# Patient Record
Sex: MALE | Race: WHITE | NOT HISPANIC OR LATINO | Employment: FULL TIME | URBAN - NONMETROPOLITAN AREA
[De-identification: names, ages, dates, MRNs, and addresses within clinical notes are randomized per-mention and may not be internally consistent; named-entity substitution may affect disease eponyms.]

---

## 2019-05-20 ENCOUNTER — LAB (OUTPATIENT)
Dept: LAB | Facility: HOSPITAL | Age: 44
End: 2019-05-20

## 2019-05-20 ENCOUNTER — OFFICE VISIT (OUTPATIENT)
Dept: INTERNAL MEDICINE | Facility: CLINIC | Age: 44
End: 2019-05-20

## 2019-05-20 VITALS
DIASTOLIC BLOOD PRESSURE: 82 MMHG | SYSTOLIC BLOOD PRESSURE: 120 MMHG | HEIGHT: 73 IN | BODY MASS INDEX: 32.47 KG/M2 | WEIGHT: 245 LBS | RESPIRATION RATE: 16 BRPM | HEART RATE: 56 BPM | OXYGEN SATURATION: 98 %

## 2019-05-20 DIAGNOSIS — Z94.7 CORNEAL TRANSPLANT STATUS: ICD-10-CM

## 2019-05-20 DIAGNOSIS — Z76.89 ENCOUNTER TO ESTABLISH CARE: ICD-10-CM

## 2019-05-20 DIAGNOSIS — K21.9 GASTROESOPHAGEAL REFLUX DISEASE WITHOUT ESOPHAGITIS: ICD-10-CM

## 2019-05-20 DIAGNOSIS — Z76.89 ENCOUNTER TO ESTABLISH CARE: Primary | ICD-10-CM

## 2019-05-20 DIAGNOSIS — R53.83 FATIGUE, UNSPECIFIED TYPE: ICD-10-CM

## 2019-05-20 LAB
ALBUMIN SERPL-MCNC: 4.4 G/DL (ref 3.5–5)
ALBUMIN/GLOB SERPL: 1.5 G/DL (ref 1.1–2.5)
ALP SERPL-CCNC: 58 U/L (ref 24–120)
ALT SERPL W P-5'-P-CCNC: 41 U/L (ref 0–54)
ANION GAP SERPL CALCULATED.3IONS-SCNC: 8 MMOL/L (ref 4–13)
ARTICHOKE IGE QN: 111 MG/DL (ref 0–99)
AST SERPL-CCNC: 38 U/L (ref 7–45)
BASOPHILS # BLD AUTO: 0.05 10*3/MM3 (ref 0–0.2)
BASOPHILS NFR BLD AUTO: 0.9 % (ref 0–2)
BILIRUB SERPL-MCNC: 0.6 MG/DL (ref 0.1–1)
BUN BLD-MCNC: 17 MG/DL (ref 5–21)
BUN/CREAT SERPL: 15.9 (ref 7–25)
CALCIUM SPEC-SCNC: 9.1 MG/DL (ref 8.4–10.4)
CHLORIDE SERPL-SCNC: 105 MMOL/L (ref 98–110)
CHOLEST SERPL-MCNC: 198 MG/DL (ref 130–200)
CO2 SERPL-SCNC: 28 MMOL/L (ref 24–31)
CREAT BLD-MCNC: 1.07 MG/DL (ref 0.5–1.4)
DEPRECATED RDW RBC AUTO: 39.8 FL (ref 40–54)
EOSINOPHIL # BLD AUTO: 0.15 10*3/MM3 (ref 0–0.7)
EOSINOPHIL NFR BLD AUTO: 2.6 % (ref 0–4)
ERYTHROCYTE [DISTWIDTH] IN BLOOD BY AUTOMATED COUNT: 12.6 % (ref 12–15)
GFR SERPL CREATININE-BSD FRML MDRD: 75 ML/MIN/1.73
GLOBULIN UR ELPH-MCNC: 2.9 GM/DL
GLUCOSE BLD-MCNC: 82 MG/DL (ref 70–100)
HCT VFR BLD AUTO: 41.7 % (ref 40–52)
HDLC SERPL-MCNC: 58 MG/DL
HGB BLD-MCNC: 14 G/DL (ref 14–18)
IMM GRANULOCYTES # BLD AUTO: 0.03 10*3/MM3 (ref 0–0.05)
IMM GRANULOCYTES NFR BLD AUTO: 0.5 % (ref 0–5)
LDLC/HDLC SERPL: 2.26 {RATIO}
LYMPHOCYTES # BLD AUTO: 2.17 10*3/MM3 (ref 0.72–4.86)
LYMPHOCYTES NFR BLD AUTO: 38.1 % (ref 15–45)
MCH RBC QN AUTO: 29 PG (ref 28–32)
MCHC RBC AUTO-ENTMCNC: 33.6 G/DL (ref 33–36)
MCV RBC AUTO: 86.5 FL (ref 82–95)
MONOCYTES # BLD AUTO: 0.56 10*3/MM3 (ref 0.19–1.3)
MONOCYTES NFR BLD AUTO: 9.8 % (ref 4–12)
NEUTROPHILS # BLD AUTO: 2.73 10*3/MM3 (ref 1.87–8.4)
NEUTROPHILS NFR BLD AUTO: 48.1 % (ref 39–78)
NRBC BLD AUTO-RTO: 0 /100 WBC (ref 0–0.2)
PLATELET # BLD AUTO: 239 10*3/MM3 (ref 130–400)
PMV BLD AUTO: 10 FL (ref 6–12)
POTASSIUM BLD-SCNC: 4.4 MMOL/L (ref 3.5–5.3)
PROT SERPL-MCNC: 7.3 G/DL (ref 6.3–8.7)
RBC # BLD AUTO: 4.82 10*6/MM3 (ref 4.8–5.9)
SODIUM BLD-SCNC: 141 MMOL/L (ref 135–145)
TRIGL SERPL-MCNC: 46 MG/DL (ref 0–149)
TSH SERPL DL<=0.05 MIU/L-ACNC: 2.68 MIU/ML (ref 0.47–4.68)
WBC NRBC COR # BLD: 5.69 10*3/MM3 (ref 4.8–10.8)

## 2019-05-20 PROCEDURE — 84443 ASSAY THYROID STIM HORMONE: CPT | Performed by: INTERNAL MEDICINE

## 2019-05-20 PROCEDURE — 99203 OFFICE O/P NEW LOW 30 MIN: CPT | Performed by: INTERNAL MEDICINE

## 2019-05-20 PROCEDURE — 85025 COMPLETE CBC W/AUTO DIFF WBC: CPT | Performed by: INTERNAL MEDICINE

## 2019-05-20 PROCEDURE — 36415 COLL VENOUS BLD VENIPUNCTURE: CPT

## 2019-05-20 PROCEDURE — 84403 ASSAY OF TOTAL TESTOSTERONE: CPT | Performed by: INTERNAL MEDICINE

## 2019-05-20 PROCEDURE — 80061 LIPID PANEL: CPT | Performed by: INTERNAL MEDICINE

## 2019-05-20 PROCEDURE — 80053 COMPREHEN METABOLIC PANEL: CPT | Performed by: INTERNAL MEDICINE

## 2019-05-20 NOTE — PROGRESS NOTES
Chief Complaint   Patient presents with   • [facility]     No complaints         History:  Tejas Fernandes is a 43 y.o. male who presents today to [facility].  He has been feeling somewhat fatigued.  He has more energy when he works out but has not been doing so lately.  He has gained 25 to 30 pounds in the last year and a half and does report some snoring at night.  He is unsure whether he is fatigued related to having young children but is also concerned about low testosterone.  He also reports intermittent reflux disease, waking up sometimes in the middle the night with reflux.  This is partially resolved by sleeping upright.  He has not tried any medications including over-the-counter medications.    HPI    ROS:  Review of Systems   Constitutional: Positive for activity change and fatigue.   HENT: Negative for hearing loss and trouble swallowing.    Eyes: Positive for visual disturbance (Decreased vision right eye since his corneal transplant).        Has history of right corneal transplant   Respiratory: Negative for cough, chest tightness and shortness of breath.    Cardiovascular: Negative for chest pain and leg swelling.   Gastrointestinal: Negative for abdominal distention, abdominal pain, nausea and vomiting.   Genitourinary: Negative for difficulty urinating, frequency and urgency.   Musculoskeletal: Negative for arthralgias, back pain and gait problem.   Skin: Negative.    Neurological: Negative for dizziness and facial asymmetry.   Hematological: Negative for adenopathy.   Psychiatric/Behavioral: Negative for decreased concentration.       No Known Allergies  Past Medical History:   Diagnosis Date   • GERD (gastroesophageal reflux disease)      Past Surgical History:   Procedure Laterality Date   • CORNEAL TRANSPLANT  1997   • TONSILLECTOMY  1980     Family History   Problem Relation Age of Onset   • Cancer Mother    • Diabetes Mother    • Cancer Father    • Other Maternal Grandmother    • Stroke  "Maternal Grandfather    • Cancer Paternal Grandmother      Tejas  reports that he has never smoked. He has never used smokeless tobacco. He reports that he drinks alcohol. He reports that he does not use drugs.    I have reviewed and updated the above documentation (if necessary) including but not limited to chief complaint, ROS, PFSH, allergies and medications      No current outpatient medications on file.    OBJECTIVE:    Visit Vitals  /82 (BP Location: Left arm, Patient Position: Sitting, Cuff Size: Adult)   Pulse 56   Resp 16   Ht 185.4 cm (73\")   Wt 111 kg (245 lb)   SpO2 98%   BMI 32.32 kg/m²       Physical Exam   Constitutional: He appears well-developed and well-nourished. No distress.   HENT:   Head: Normocephalic and atraumatic.   Mouth/Throat: No oropharyngeal exudate.   Eyes: Conjunctivae and EOM are normal. Right eye exhibits no discharge. Left eye exhibits no discharge. No scleral icterus. Right pupil is not reactive. Left pupil is reactive. Pupils are unequal.   Right pupil nonreactive and dilated, chronic issue related to corneal transplant   Neck: Normal range of motion. No JVD present.   Cardiovascular: Normal rate and regular rhythm.   No murmur heard.  Pulmonary/Chest: Effort normal and breath sounds normal. No stridor. No respiratory distress. He has no wheezes. He has no rales.   Abdominal: Soft. Bowel sounds are normal. He exhibits no distension. There is no tenderness. There is no guarding.   Musculoskeletal: He exhibits no edema or tenderness.   Lymphadenopathy:     He has no cervical adenopathy.   Neurological: He is alert.   Skin: Skin is warm and dry.   Psychiatric: He has a normal mood and affect. His behavior is normal.   Vitals reviewed.      Assessment/Plan    Tejas was seen today for establish care.    Diagnoses and all orders for this visit:    Encounter to establish care    Fatigue, unspecified type  -     CBC w AUTO Differential; Future  -     Comprehensive metabolic " panel; Future  -     TSH; Future  -     Testosterone; Future    Gastroesophageal reflux disease without esophagitis    Corneal transplant status    Fatigue: We will check general labs including CBC, CMP, TSH and testosterone.  He does snore and there is a possibility of having obstructive sleep apnea.  He wishes to wait on sleep study at this point and this is reasonable.  He will attempt to lose some weight and see if his snoring improves.    GERD: His lifestyle modifications have been adequate in treating.  Discussed H2 blockers if he continues with symptoms    Right-sided corneal transplant currently on currently no issues    Health maintenance: We will check CBC, CMP and lipid profile.  He has not eaten today.  Next visit discuss prostate cancer screening and colon cancer screening given father history at age 70 of colon cancer    An After Visit Summary was printed and given to the patient at discharge.  Return in about 1 year (around 5/20/2020) for Annual physical.         ISSAC Alvarado MD   11:07 AM  5/20/2019

## 2019-05-21 LAB — TESTOST SERPL-MCNC: 363 NG/DL (ref 264–916)

## 2019-05-23 ENCOUNTER — TELEPHONE (OUTPATIENT)
Dept: INTERNAL MEDICINE | Facility: CLINIC | Age: 44
End: 2019-05-23

## 2020-06-04 DIAGNOSIS — G47.33 OSA (OBSTRUCTIVE SLEEP APNEA): Primary | ICD-10-CM

## 2020-06-17 ENCOUNTER — HOSPITAL ENCOUNTER (OUTPATIENT)
Dept: SLEEP MEDICINE | Facility: HOSPITAL | Age: 45
Discharge: HOME | End: 2020-06-17
Admitting: INTERNAL MEDICINE

## 2020-06-17 DIAGNOSIS — G47.33 OSA (OBSTRUCTIVE SLEEP APNEA): ICD-10-CM

## 2020-06-17 PROCEDURE — 95800 SLP STDY UNATTENDED: CPT

## 2020-06-25 PROCEDURE — 95800 SLP STDY UNATTENDED: CPT | Performed by: PSYCHIATRY & NEUROLOGY

## 2020-06-29 ENCOUNTER — TELEPHONE (OUTPATIENT)
Dept: INTERNAL MEDICINE | Facility: CLINIC | Age: 45
End: 2020-06-29

## 2020-06-29 NOTE — TELEPHONE ENCOUNTER
Spoke with patient regarding his home sleep study. He is aware of results and he understood that Dr. Rojo will be contacting him to get set up with a CPAP machine.

## 2020-06-29 NOTE — TELEPHONE ENCOUNTER
----- Message from MARIELLA Alvarado MD sent at 6/29/2020  9:15 AM CDT -----  Please notify patient he does have sleep apnea.  He will be contacted by Dr. Rojo to get established with CPAP

## 2020-07-20 ENCOUNTER — OFFICE VISIT (OUTPATIENT)
Dept: INTERNAL MEDICINE | Facility: CLINIC | Age: 45
End: 2020-07-20

## 2020-07-20 VITALS
RESPIRATION RATE: 18 BRPM | HEIGHT: 73 IN | WEIGHT: 255.4 LBS | DIASTOLIC BLOOD PRESSURE: 77 MMHG | SYSTOLIC BLOOD PRESSURE: 133 MMHG | TEMPERATURE: 98.3 F | BODY MASS INDEX: 33.85 KG/M2 | HEART RATE: 77 BPM | OXYGEN SATURATION: 98 %

## 2020-07-20 DIAGNOSIS — C43.62 MALIGNANT MELANOMA OF LEFT UPPER EXTREMITY INCLUDING SHOULDER (HCC): ICD-10-CM

## 2020-07-20 DIAGNOSIS — E78.2 MODERATE MIXED HYPERLIPIDEMIA NOT REQUIRING STATIN THERAPY: ICD-10-CM

## 2020-07-20 DIAGNOSIS — Z00.00 ENCOUNTER FOR PREVENTIVE CARE: Primary | ICD-10-CM

## 2020-07-20 DIAGNOSIS — G47.33 OSA (OBSTRUCTIVE SLEEP APNEA): ICD-10-CM

## 2020-07-20 DIAGNOSIS — Z11.59 ENCOUNTER FOR HEPATITIS C SCREENING TEST FOR LOW RISK PATIENT: ICD-10-CM

## 2020-07-20 DIAGNOSIS — Z13.6 HYPERTENSION SCREEN: ICD-10-CM

## 2020-07-20 PROBLEM — C43.9 MELANOMA (HCC): Status: ACTIVE | Noted: 2020-07-20

## 2020-07-20 PROCEDURE — 99396 PREV VISIT EST AGE 40-64: CPT | Performed by: INTERNAL MEDICINE

## 2020-07-20 NOTE — PROGRESS NOTES
Chief Complaint   Patient presents with   • Office Visit   • Physical         History:  Tejas Fernandes is a 44 y.o. male who presents today for his annual physical.  He has been doing well.  Since last visit he was diagnosed with obstructive sleep apnea.  He is now on his CPAP and had his first night this past Friday.  He has not had good sleep yet, due to the mask and getting used to the machine.    He was diagnosed with left arm melanoma by Dr. Yani Vargas and has had surgery.    He went to [facility] for [facility] therapy    He has gained weight during COVID-19 and will be starting to exercise soon.    Otherwise he denies any complaints or new issues.      HPI    ROS:  Review of Systems   Constitutional: Negative for activity change and fatigue.   HENT: Negative for hearing loss and trouble swallowing.    Eyes: Positive for visual disturbance (Decreased vision right eye since his corneal transplant).        Has history of right corneal transplant   Respiratory: Negative for cough, chest tightness and shortness of breath.    Cardiovascular: Negative for chest pain and leg swelling.   Gastrointestinal: Negative for abdominal distention, abdominal pain, nausea and vomiting.   Genitourinary: Negative for difficulty urinating, frequency and urgency.   Musculoskeletal: Negative for arthralgias, back pain and gait problem.   Skin: Negative.    Neurological: Negative for dizziness and facial asymmetry.   Hematological: Negative for adenopathy.   Psychiatric/Behavioral: Positive for sleep disturbance. Negative for decreased concentration.       No Known Allergies  Past Medical History:   Diagnosis Date   • GERD (gastroesophageal reflux disease)      Past Surgical History:   Procedure Laterality Date   • CORNEAL TRANSPLANT  1997   • TONSILLECTOMY  1980     Family History   Problem Relation Age of Onset   • Cancer Mother    • Diabetes Mother    • Cancer Father    • Other Maternal Grandmother    • Stroke Maternal Grandfather   "  • Cancer Paternal Grandmother      Tejas  reports that he has never smoked. He has never used smokeless tobacco. He reports that he drinks alcohol. He reports that he does not use drugs.    I have reviewed and updated the above documentation (if necessary) including but not limited to chief complaint, ROS, PFSH, allergies and medications        Current Outpatient Medications:   •  TESTOSTERONE IM, Inject 0.5 mL into the appropriate muscle as directed by prescriber 2 (Two) Times a Week., Disp: , Rfl:     OBJECTIVE:    Visit Vitals  /77 (BP Location: Left arm, Patient Position: Sitting, Cuff Size: Adult)   Pulse 77   Temp 98.3 °F (36.8 °C) (Oral)   Resp 18   Ht 185.4 cm (73\")   Wt 116 kg (255 lb 6.4 oz)   SpO2 98%   BMI 33.70 kg/m²       Physical Exam   Constitutional: He appears well-developed and well-nourished. No distress.   HENT:   Head: Normocephalic and atraumatic.   Eyes: Conjunctivae and EOM are normal. Right eye exhibits no discharge. Left eye exhibits no discharge. No scleral icterus. Right pupil is not reactive. Left pupil is reactive. Pupils are unequal.   Right pupil nonreactive and dilated, chronic issue related to corneal transplant   Neck: Normal range of motion. No JVD present.   Cardiovascular: Normal rate and regular rhythm.   No murmur heard.  Pulmonary/Chest: Effort normal and breath sounds normal. No stridor. No respiratory distress. He has no wheezes. He has no rales.   Abdominal: Soft. Bowel sounds are normal. He exhibits no distension. There is no tenderness. There is no guarding.   Musculoskeletal: He exhibits no edema or tenderness.   Lymphadenopathy:     He has no cervical adenopathy.   Neurological: He is alert.   Skin: Skin is warm and dry.   Psychiatric: He has a normal mood and affect. His behavior is normal.   Vitals reviewed.      Assessment/Plan    Tejas was seen today for office visit and physical.    Diagnoses and all orders for this visit:    Encounter for preventive " care  -     CBC & Differential; Future  -     Comprehensive Metabolic Panel; Future  -     Hemoglobin A1c; Future  -     Hepatitis C Antibody; Future  -     Lipid Panel; Future    Moderate mixed hyperlipidemia not requiring statin therapy  -     Lipid Panel; Future    JORGITO (obstructive sleep apnea)    Malignant melanoma of left upper extremity including shoulder (CMS/HCC)    Encounter for hepatitis C screening test for low risk patient  -     Hepatitis C Antibody; Future    Hypertension screen      Obtain general labs as above for preventive care.  His blood pressure slightly elevated today, which will improve with him exercising.    He has mild elevation of LDL, and I would like to recheck his lipid panel today    He has left upper arm melanoma removed by Dr. Yani Vargas    Continue CPAP therapy prescribed by Dr. Rojo, discussed that there are plenty of mask options so if this first 1 does not work for him he could talk to [facility] to get a new mask.    Right-sided corneal transplant currently on currently no issues     Next visit discuss prostate cancer screening and colon cancer screening given father history at age 70 of colon cancer    An After Visit Summary was printed and given to the patient at discharge.  Return in about 1 year (around 7/20/2021) for Annual physical.         ISSAC Avlarado MD   11:07 AM  7/20/2020

## 2020-12-03 ENCOUNTER — OFFICE VISIT (OUTPATIENT)
Dept: NEUROLOGY | Facility: CLINIC | Age: 45
End: 2020-12-03

## 2020-12-03 VITALS
HEART RATE: 76 BPM | SYSTOLIC BLOOD PRESSURE: 124 MMHG | HEIGHT: 73 IN | WEIGHT: 246 LBS | OXYGEN SATURATION: 98 % | DIASTOLIC BLOOD PRESSURE: 82 MMHG | BODY MASS INDEX: 32.6 KG/M2

## 2020-12-03 DIAGNOSIS — G47.33 OBSTRUCTIVE SLEEP APNEA: Primary | ICD-10-CM

## 2020-12-03 PROCEDURE — 99213 OFFICE O/P EST LOW 20 MIN: CPT | Performed by: PHYSICIAN ASSISTANT

## 2020-12-03 NOTE — PROGRESS NOTES
Neurology Progress Note      Chief Complaint:    Obstructive sleep apnea  Daytime hypersomnolence    Subjective     Subjective:  This is a very pleasant 45-year-old right-hand-dominant male routinely cared for by Dr. Casey Alvarado MD, who presents today for initial evaluation following diagnosis of obstructive sleep apnea.    On 6/24/2020, patient underwent home sleep study demonstrating that RDI of 20.8 and AHI of 17.6 with a low SPO2 of 87%.  Patient was placed on auto CPAP with a range of 8-16 cm H2O.  Compliance download from 11-20-12/1/2020 demonstrates 97% usage with 77% greater than 4 hours per night with an average usage time of 5 hours and 22 minutes.  AHI during this timeframe is 0.7.  He is using [facility] home [facility] as his DME.  He has no significant leaking.  He feels as though he is now having restorative sleep.    Past Medical History:   Diagnosis Date   • GERD (gastroesophageal reflux disease)      Past Surgical History:   Procedure Laterality Date   • CORNEAL TRANSPLANT  1997   • TONSILLECTOMY  1980     Family History   Problem Relation Age of Onset   • Cancer Mother    • Diabetes Mother    • Cancer Father    • Other Maternal Grandmother    • Stroke Maternal Grandfather    • Cancer Paternal Grandmother      Social History     Tobacco Use   • Smoking status: Never Smoker   • Smokeless tobacco: Never Used   Substance Use Topics   • Alcohol use: Yes     Comment: occasionally   • Drug use: No       Medications:  Current Outpatient Medications   Medication Sig Dispense Refill   • TESTOSTERONE IM Inject 0.5 mL into the appropriate muscle as directed by prescriber 2 (Two) Times a Week.       No current facility-administered medications for this visit.        Allergies:    Patient has no known allergies.    Review of Systems:   -A 14 point review of systems is completed and is negative.      Objective      Vital Signs  Heart Rate:  [76] 76  BP: (124)/(82) 124/82    Physical Exam:    General Exam:  Head:   Normocephalic, atraumatic.  HEENT: PERRLA.  Full EOM.  Mallampati Class II anatomy.  Neck:  No lymphadenopathy, thyromegaly or bruit.  Neck circumference is 16.5 inches.  Cardiac:  Regular rate and rhythm.  Normal S1, S2.  No murmur, rub or gallop.  Lungs:  Clear to auscultation bilaterally.  No wheeze, rales or rhonchi.  Abdomen:  Non-tender, Non-distended.  Bowel sounds normoactive.  Extremities: Full peripheral pulses.  No clubbing, cyanosis or edema.  Skin: No ulceration, breakdown or rash.       Results Review:        Assessment/Plan     Impression:  1.  Obstructive sleep apnea      Plan:  1. I have reviewed the current compliance download and findings of the previous polysomnography with the patient in detail.  The patient voices understanding and recognizes the need for adherence to the prescribed therapy.  They understand that a certain level of compliance allows for continued insurance coverage as well as adequate treatment of underlying apnea.  They understand the impact this has upon their overall health status and other medical comorbidities.  2. I have recommended instituting regular cardiovascular exercise in the form of walking, biking or swimming 30-40 minutes at a time at least 3-4 times per week.  3. Counseled on multimodal approach to treatment of sleep apnea to include but not limited to diet, exercise, sleep hygiene, compliance with pap therapy. Encouraged lateral sleeping position and to avoid sedatives or alcohol close to bedtime. Risks of untreated sleep apnea were discussed to include but not limited to HTN, heart disease, stroke, cardiac arrhythmia such as AFIB, and dementia.      Patient will keep an annual sleep compliance review appointment with us.  He is to call for concerns or questions in the interim.       Dima Ellis PA-C  12/03/20  08:59 CST

## 2021-12-16 ENCOUNTER — OFFICE VISIT (OUTPATIENT)
Dept: NEUROLOGY | Facility: CLINIC | Age: 46
End: 2021-12-16

## 2021-12-16 VITALS
DIASTOLIC BLOOD PRESSURE: 76 MMHG | RESPIRATION RATE: 17 BRPM | HEART RATE: 74 BPM | BODY MASS INDEX: 29.95 KG/M2 | SYSTOLIC BLOOD PRESSURE: 124 MMHG | WEIGHT: 226 LBS | HEIGHT: 73 IN | OXYGEN SATURATION: 100 %

## 2021-12-16 DIAGNOSIS — G47.33 OBSTRUCTIVE SLEEP APNEA: Primary | ICD-10-CM

## 2021-12-16 PROCEDURE — 99213 OFFICE O/P EST LOW 20 MIN: CPT | Performed by: NURSE PRACTITIONER

## 2021-12-16 NOTE — PROGRESS NOTES
Neurology Progress Note      Chief Complaint:    Obstructive sleep apnea     Subjective     Subjective:  Tejas Fernandes is a 46 y.o. male who presents to the office today for obstructive sleep apnea.      He presents today doing well.  He has no new complaints or concerns today.  He states compliance with therapy but notes that he was less than compliant with therapy while on vacation recently.  He denies any new symptoms.  He denies any snoring over therapy, sudden awakenings at night, gasping for air, non-restorative sleep, or daytime hypersomnolence.  He continues to use [facility] for DME and has had no issues with obtaining supplies.  He is pleased with his current therapy.    Past Medical History:   Diagnosis Date   • GERD (gastroesophageal reflux disease)      Past Surgical History:   Procedure Laterality Date   • CORNEAL TRANSPLANT  1997   • TONSILLECTOMY  1980     Family History   Problem Relation Age of Onset   • Cancer Mother    • Diabetes Mother    • Cancer Father    • Other Maternal Grandmother    • Stroke Maternal Grandfather    • Cancer Paternal Grandmother      Social History     Tobacco Use   • Smoking status: Never Smoker   • Smokeless tobacco: Never Used   Substance Use Topics   • Alcohol use: Yes     Comment: occasionally   • Drug use: No     Medications:  Current Outpatient Medications   Medication Sig Dispense Refill   • TESTOSTERONE IM Inject 0.5 mL into the appropriate muscle as directed by prescriber 2 (Two) Times a Week.       No current facility-administered medications for this visit.     Current outpatient and discharge medications have been reconciled for the patient.  Reviewed by: TIFFANIE Ramesh      Allergies:    Patient has no known allergies.    Review of Systems:   Review of Systems   Psychiatric/Behavioral: Positive for sleep disturbance.   All other systems reviewed and are negative.        Objective      Vital Signs  Heart Rate:  [74] 74  Resp:  [17] 17  BP: (124)/(76)  124/76    Physical Exam:  Physical Exam  Vitals reviewed.   Constitutional:       Appearance: Normal appearance.   HENT:      Head: Normocephalic.   Eyes:      Extraocular Movements: Extraocular movements intact.      Pupils: Pupils are equal, round, and reactive to light.   Cardiovascular:      Rate and Rhythm: Normal rate and regular rhythm.      Pulses: Normal pulses.   Pulmonary:      Effort: Pulmonary effort is normal.   Musculoskeletal:         General: Normal range of motion.      Cervical back: Normal range of motion and neck supple.   Skin:     General: Skin is warm and dry.      Capillary Refill: Capillary refill takes less than 2 seconds.   Neurological:      Gait: Gait is intact.   Psychiatric:         Mood and Affect: Mood normal.       Mallampati Classification: II (hard and soft palate, upper portion of tonsils anduvula visible)       Results Review:      Burns Sleepiness Scale: 0    STOP-BANG: Low risk JORGITO    Compliance Report:   This compliance report is for the dates of 11/14/2021-12/13/2021.  The patient used the PAP device for 24th/30 days for a 80% compliance.  Of those days, the device was used for greater than 4 hours for 18 days for a 60 compliance.  The patient is set on APAP with a minimum pressure of 8 cm H2O and a maximum pressure of 16 cm H2O.  AHI is currently 0.5.    Assessment/Plan     Impression:  • Obstructive sleep apnea  • Poor Compliance with CPAP    Plan:  · Continue with CPAP therapy. The patient recognizes the need for adherence to the prescribed therapy.    I have encouraged the patient to increase compliance with therapy as this is going to be important to the overall treatment of his obstructive sleep apnea.  He states understanding.    · I will compliance report in 30 days to check his updated compliance.    · I have recommended regular cardiovascular exercise in the form of walking, biking or swimming 30-40 minutes at a time at least 3-4 times per week.    · Counseled  on multimodal approach to treatment of sleep apnea to include but not limited to diet, exercise, sleep hygiene, compliance with pap therapy.     · Encouraged lateral sleeping position and to avoid sedatives or alcohol close to bedtime.     · Risks of untreated sleep apnea were discussed to include but not limited to HTN, heart disease, stroke, cardiac arrhythmia such as AFIB, and dementia.    · I have reviewed the current compliance download with the patient in detail.  He  understands that a certain level of compliance allows for continued insurance coverage as well as adequate treatment of underlying apnea.  He also understands the impact this has upon their overall health status and other medical comorbidities.     The plan of care was fully discussed with the patient and they are in full agreement at this time.     Follow-Up:  • Return in about 1 year (around 12/16/2022) for Obstructive sleep apnea follow-up, Annual compliance review.      TIFFANIE Ramesh  12/16/21  09:01 CST

## 2022-02-23 ENCOUNTER — OFFICE VISIT (OUTPATIENT)
Dept: INTERNAL MEDICINE | Facility: CLINIC | Age: 47
End: 2022-02-23

## 2022-02-23 VITALS
BODY MASS INDEX: 30.48 KG/M2 | WEIGHT: 230 LBS | RESPIRATION RATE: 15 BRPM | OXYGEN SATURATION: 98 % | HEART RATE: 74 BPM | DIASTOLIC BLOOD PRESSURE: 70 MMHG | TEMPERATURE: 98 F | SYSTOLIC BLOOD PRESSURE: 132 MMHG | HEIGHT: 73 IN

## 2022-02-23 DIAGNOSIS — Z13.6 HYPERTENSION SCREEN: ICD-10-CM

## 2022-02-23 DIAGNOSIS — Z13.31 DEPRESSION SCREEN: ICD-10-CM

## 2022-02-23 DIAGNOSIS — G47.33 OSA (OBSTRUCTIVE SLEEP APNEA): ICD-10-CM

## 2022-02-23 DIAGNOSIS — E66.9 CLASS 1 OBESITY WITH SERIOUS COMORBIDITY AND BODY MASS INDEX (BMI) OF 30.0 TO 30.9 IN ADULT, UNSPECIFIED OBESITY TYPE: ICD-10-CM

## 2022-02-23 DIAGNOSIS — Z00.00 ENCOUNTER FOR PREVENTIVE CARE: Primary | ICD-10-CM

## 2022-02-23 DIAGNOSIS — Z12.11 SCREEN FOR COLON CANCER: ICD-10-CM

## 2022-02-23 DIAGNOSIS — Z11.59 NEED FOR HEPATITIS C SCREENING TEST: ICD-10-CM

## 2022-02-23 PROBLEM — J30.1 ALLERGIC RHINITIS DUE TO POLLEN: Status: ACTIVE | Noted: 2022-02-23

## 2022-02-23 PROBLEM — R07.89 CHEST WALL PAIN: Status: ACTIVE | Noted: 2022-02-23

## 2022-02-23 PROCEDURE — 99396 PREV VISIT EST AGE 40-64: CPT | Performed by: INTERNAL MEDICINE

## 2022-02-23 NOTE — PROGRESS NOTES
Chief Complaint   Patient presents with   • Annual Exam         History:  Tejas Fernandes is a 46 y.o. male who presents today for evaluation of the above problems.      He presents today for his annual physical.  Denies any new issues or complaints.  He has had left upper arm melanoma removed by Dr. Vargas.  He continues to follow-up with her every 3 months and is doing very well in this regard.    He gets testosterone replacement with Dr. Black is getting every 3-month labs.    He is up-to-date on his eye and dental exam.    He drinks occasional alcohol but denies any tobacco or drug use.    He has been very active.  He is lifting more weights recently.  He is also doing cardio working out at [employer] and riding his stationary bike    He has had COVID-19 vaccines including the booster.  He did have COVID-19 earlier this year.    He is also doing well with his CPAP.    HPI    Social History     Socioeconomic History   • Marital status:    Tobacco Use   • Smoking status: Never Smoker   • Smokeless tobacco: Never Used   Substance and Sexual Activity   • Alcohol use: Yes     Comment: occasionally   • Drug use: No   • Sexual activity: Yes     Partners: Female       PHQ-9 Depression Screening  Little interest or pleasure in doing things? 0   Feeling down, depressed, or hopeless? 0   Trouble falling or staying asleep, or sleeping too much?     Feeling tired or having little energy?     Poor appetite or overeating?     Feeling bad about yourself - or that you are a failure or have let yourself or your family down?     Trouble concentrating on things, such as reading the newspaper or watching television?     Moving or speaking so slowly that other people could have noticed? Or the opposite - being so fidgety or restless that you have been moving around a lot more than usual?     Thoughts that you would be better off dead, or of hurting yourself in some way?     PHQ-9 Total Score 0   If you checked off any problems,  how difficult have these problems made it for you to do your work, take care of things at home, or get along with other people?       PHQ-9 Total Score: 0    ROS:  Review of Systems   Constitutional: Negative for activity change, appetite change, fatigue, fever and unexpected weight change.   HENT: Negative for nosebleeds, sore throat and trouble swallowing.    Eyes: Negative for pain and visual disturbance.   Respiratory: Negative for cough, chest tightness and shortness of breath.    Cardiovascular: Negative for chest pain, palpitations and leg swelling.   Gastrointestinal: Negative for abdominal pain, constipation, diarrhea, nausea and vomiting.   Endocrine: Negative for cold intolerance and heat intolerance.   Genitourinary: Negative.    Musculoskeletal: Negative.  Negative for back pain, neck pain and neck stiffness.   Skin: Negative for rash and wound.   Neurological: Negative for dizziness, syncope, weakness, light-headedness and headaches.   Hematological: Negative for adenopathy. Does not bruise/bleed easily.   Psychiatric/Behavioral: Negative for agitation, behavioral problems and confusion.         Current Outpatient Medications:   •  TESTOSTERONE IM, Inject 0.5 mL into the appropriate muscle as directed by prescriber 2 (Two) Times a Week., Disp: , Rfl:     Lab Results   Component Value Date    GLUCOSE 82 05/20/2019    BUN 17 05/20/2019    CREATININE 1.07 05/20/2019    EGFRIFNONA 75 05/20/2019    BCR 15.9 05/20/2019    K 4.4 05/20/2019    CO2 28.0 05/20/2019    CALCIUM 9.1 05/20/2019    ALBUMIN 4.40 05/20/2019    AST 38 05/20/2019    ALT 41 05/20/2019       WBC   Date Value Ref Range Status   05/20/2019 5.69 4.80 - 10.80 10*3/mm3 Final     RBC   Date Value Ref Range Status   05/20/2019 4.82 4.80 - 5.90 10*6/mm3 Final     Hemoglobin   Date Value Ref Range Status   05/20/2019 14.0 14.0 - 18.0 g/dL Final     Hematocrit   Date Value Ref Range Status   05/20/2019 41.7 40.0 - 52.0 % Final     MCV   Date Value  "Ref Range Status   05/20/2019 86.5 82.0 - 95.0 fL Final     MCH   Date Value Ref Range Status   05/20/2019 29.0 28.0 - 32.0 pg Final     MCHC   Date Value Ref Range Status   05/20/2019 33.6 33.0 - 36.0 g/dL Final     RDW   Date Value Ref Range Status   05/20/2019 12.6 12.0 - 15.0 % Final     RDW-SD   Date Value Ref Range Status   05/20/2019 39.8 (L) 40.0 - 54.0 fl Final     MPV   Date Value Ref Range Status   05/20/2019 10.0 6.0 - 12.0 fL Final     Platelets   Date Value Ref Range Status   05/20/2019 239 130 - 400 10*3/mm3 Final     Neutrophil %   Date Value Ref Range Status   05/20/2019 48.1 39.0 - 78.0 % Final     Lymphocyte %   Date Value Ref Range Status   05/20/2019 38.1 15.0 - 45.0 % Final     Monocyte %   Date Value Ref Range Status   05/20/2019 9.8 4.0 - 12.0 % Final     Eosinophil %   Date Value Ref Range Status   05/20/2019 2.6 0.0 - 4.0 % Final     Basophil %   Date Value Ref Range Status   05/20/2019 0.9 0.0 - 2.0 % Final     Immature Grans %   Date Value Ref Range Status   05/20/2019 0.5 0.0 - 5.0 % Final     Neutrophils, Absolute   Date Value Ref Range Status   05/20/2019 2.73 1.87 - 8.40 10*3/mm3 Final     Lymphocytes, Absolute   Date Value Ref Range Status   05/20/2019 2.17 0.72 - 4.86 10*3/mm3 Final     Monocytes, Absolute   Date Value Ref Range Status   05/20/2019 0.56 0.19 - 1.30 10*3/mm3 Final     Eosinophils, Absolute   Date Value Ref Range Status   05/20/2019 0.15 0.00 - 0.70 10*3/mm3 Final     Basophils, Absolute   Date Value Ref Range Status   05/20/2019 0.05 0.00 - 0.20 10*3/mm3 Final     Immature Grans, Absolute   Date Value Ref Range Status   05/20/2019 0.03 0.00 - 0.05 10*3/mm3 Final     nRBC   Date Value Ref Range Status   05/20/2019 0.0 0.0 - 0.2 /100 WBC Final         OBJECTIVE:  Visit Vitals  /70 (BP Location: Left arm, Patient Position: Sitting, Cuff Size: Adult)   Pulse 74   Temp 98 °F (36.7 °C) (Oral)   Resp 15   Ht 185.4 cm (72.99\")   Wt 104 kg (230 lb)   SpO2 98%   BMI " 30.35 kg/m²      Physical Exam  Vitals and nursing note reviewed.   Constitutional:       General: He is not in acute distress.     Appearance: Normal appearance. He is well-developed.      Comments: Very pleasant   HENT:      Head: Normocephalic and atraumatic.      Right Ear: Tympanic membrane, ear canal and external ear normal. There is no impacted cerumen.      Left Ear: Tympanic membrane, ear canal and external ear normal. There is no impacted cerumen.      Mouth/Throat:      Pharynx: No oropharyngeal exudate.   Eyes:      General: No scleral icterus.     Conjunctiva/sclera: Conjunctivae normal.      Pupils: Pupils are equal, round, and reactive to light.   Neck:      Thyroid: No thyromegaly.      Vascular: No carotid bruit or JVD.   Cardiovascular:      Rate and Rhythm: Normal rate and regular rhythm.      Heart sounds: Normal heart sounds. No murmur heard.  No friction rub. No gallop.    Pulmonary:      Effort: Pulmonary effort is normal. No respiratory distress.      Breath sounds: Normal breath sounds. No stridor. No wheezing, rhonchi or rales.   Abdominal:      General: Bowel sounds are normal. There is no distension.      Palpations: Abdomen is soft.      Tenderness: There is no abdominal tenderness.   Skin:     General: Skin is warm and dry.      Coloration: Skin is not jaundiced.   Neurological:      Mental Status: He is alert.      Cranial Nerves: No cranial nerve deficit.   Psychiatric:         Mood and Affect: Mood normal.         Behavior: Behavior normal.         Thought Content: Thought content normal.         Judgment: Judgment normal.         Assessment/Plan    Diagnoses and all orders for this visit:    1. Encounter for preventive care (Primary)  -     CBC (No Diff); Future  -     Hemoglobin A1c; Future  -     Comprehensive Metabolic Panel; Future    2. Depression screen    3. Hypertension screen    4. Need for hepatitis C screening test  -     Hepatitis C Antibody; Future    5. Screen for  colon cancer  -     Cologuard - Stool, Per Rectum; Future    6. JORGITO (obstructive sleep apnea)    7. Class 1 obesity with serious comorbidity and body mass index (BMI) of 30.0 to 30.9 in adult, unspecified obesity type      I believe overall he is doing very well.  I would like to get the above blood work today.  He is due for colorectal cancer screening.  There is no family history of colon polyps or colon cancer and he has no personal history of polyps.  Therefore, he would be low risk and Cologuard would be appropriate for him.  We discussed this and he would like a Cologuard sent to his home.    Depression screen was negative with PHQ 2 of 0.  Hypertension screen is negative with a blood pressure of 132/70.    Continue following with Dr. Vargas periodically for history of melanoma in the left arm.  Continue following with Dr. Black for androgen replacement.    Continue with CPAP as he is doing well with this.    Follow-up in 1 year for annual physical, or sooner if indicated.    Counseling/Anticipatory Guidance Discussed: physical activity, healthy weight, misuse of tobacco, alcohol and drugs, dental health, mental health, immunizations and screenings    Patient's Body mass index is 30.35 kg/m². indicating that he is obese (BMI >30). Obesity-related health conditions include the following: obstructive sleep apnea. Obesity is improving with lifestyle modifications. BMI is is above average; BMI management plan is completed. We discussed increasing exercise..      Return in about 1 year (around 2/23/2023) for Annual physical.    Patient was given instructions and counseling regarding his/her condition or for health maintenance advice. Please see specific information pulled into the AVS if appropriate.     ISSAC Alvarado MD  15:07 CST  2/24/2022

## 2022-03-02 ENCOUNTER — LAB (OUTPATIENT)
Dept: LAB | Facility: HOSPITAL | Age: 47
End: 2022-03-02

## 2022-03-02 DIAGNOSIS — Z00.00 ENCOUNTER FOR PREVENTIVE CARE: ICD-10-CM

## 2022-03-02 DIAGNOSIS — Z11.59 NEED FOR HEPATITIS C SCREENING TEST: ICD-10-CM

## 2022-03-02 LAB
ALBUMIN SERPL-MCNC: 4.6 G/DL (ref 3.5–5.2)
ALBUMIN/GLOB SERPL: 2 G/DL
ALP SERPL-CCNC: 48 U/L (ref 39–117)
ALT SERPL W P-5'-P-CCNC: 30 U/L (ref 1–41)
ANION GAP SERPL CALCULATED.3IONS-SCNC: 11.9 MMOL/L (ref 5–15)
AST SERPL-CCNC: 23 U/L (ref 1–40)
BILIRUB SERPL-MCNC: 0.4 MG/DL (ref 0–1.2)
BUN SERPL-MCNC: 18 MG/DL (ref 6–20)
BUN/CREAT SERPL: 14.2 (ref 7–25)
CALCIUM SPEC-SCNC: 9.5 MG/DL (ref 8.6–10.5)
CHLORIDE SERPL-SCNC: 100 MMOL/L (ref 98–107)
CO2 SERPL-SCNC: 25.1 MMOL/L (ref 22–29)
CREAT SERPL-MCNC: 1.27 MG/DL (ref 0.76–1.27)
DEPRECATED RDW RBC AUTO: 42.5 FL (ref 37–54)
EGFRCR SERPLBLD CKD-EPI 2021: 70.1 ML/MIN/1.73
ERYTHROCYTE [DISTWIDTH] IN BLOOD BY AUTOMATED COUNT: 12.9 % (ref 12.3–15.4)
GLOBULIN UR ELPH-MCNC: 2.3 GM/DL
GLUCOSE SERPL-MCNC: 86 MG/DL (ref 65–99)
HBA1C MFR BLD: 5.2 % (ref 4.8–5.6)
HCT VFR BLD AUTO: 51 % (ref 37.5–51)
HCV AB SER DONR QL: NORMAL
HGB BLD-MCNC: 16.7 G/DL (ref 13–17.7)
MCH RBC QN AUTO: 29.8 PG (ref 26.6–33)
MCHC RBC AUTO-ENTMCNC: 32.7 G/DL (ref 31.5–35.7)
MCV RBC AUTO: 90.9 FL (ref 79–97)
PLATELET # BLD AUTO: 211 10*3/MM3 (ref 140–450)
PMV BLD AUTO: 9.4 FL (ref 6–12)
POTASSIUM SERPL-SCNC: 4.8 MMOL/L (ref 3.5–5.2)
PROT SERPL-MCNC: 6.9 G/DL (ref 6–8.5)
RBC # BLD AUTO: 5.61 10*6/MM3 (ref 4.14–5.8)
SODIUM SERPL-SCNC: 137 MMOL/L (ref 136–145)
WBC NRBC COR # BLD: 6.49 10*3/MM3 (ref 3.4–10.8)

## 2022-03-02 PROCEDURE — 83036 HEMOGLOBIN GLYCOSYLATED A1C: CPT

## 2022-03-02 PROCEDURE — 80061 LIPID PANEL: CPT

## 2022-03-02 PROCEDURE — 80053 COMPREHEN METABOLIC PANEL: CPT

## 2022-03-02 PROCEDURE — 86803 HEPATITIS C AB TEST: CPT

## 2022-03-02 PROCEDURE — 36415 COLL VENOUS BLD VENIPUNCTURE: CPT

## 2022-03-02 PROCEDURE — 85027 COMPLETE CBC AUTOMATED: CPT

## 2022-03-03 DIAGNOSIS — Z00.00 ENCOUNTER FOR PREVENTIVE CARE: Primary | ICD-10-CM

## 2022-03-03 LAB
CHOLEST SERPL-MCNC: 227 MG/DL (ref 0–200)
HDLC SERPL-MCNC: 58 MG/DL (ref 40–60)
LDLC SERPL CALC-MCNC: 154 MG/DL (ref 0–100)
LDLC/HDLC SERPL: 2.62 {RATIO}
TRIGL SERPL-MCNC: 86 MG/DL (ref 0–150)
VLDLC SERPL-MCNC: 15 MG/DL (ref 5–40)

## 2022-08-25 NOTE — PROGRESS NOTES
"Subjective    Mr. Fernandes is 46 y.o. male    Chief Complaint: Vasectomy Consultation     History of Present Illness  The patient has been pondering the option of a vasectomy for3 years. Anatomically this is a lower genital tract issue/procedure. With regard to context of the decision, he presently has 3 children. He is . Associated/Relevant symptoms/signs include None. He voices no additional questions about birth control options.     The following portions of the patient's history were reviewed and updated as appropriate: allergies, current medications, past family history, past medical history, past social history, past surgical history and problem list.    Review of Systems      Current Outpatient Medications:   •  TESTOSTERONE IM, Inject 0.5 mL into the appropriate muscle as directed by prescriber 2 (Two) Times a Week., Disp: , Rfl:     Past Medical History:   Diagnosis Date   • GERD (gastroesophageal reflux disease)        Past Surgical History:   Procedure Laterality Date   • CORNEAL TRANSPLANT  1997   • TONSILLECTOMY  1980       Social History     Socioeconomic History   • Marital status:    Tobacco Use   • Smoking status: Never Smoker   • Smokeless tobacco: Never Used   Substance and Sexual Activity   • Alcohol use: Yes     Comment: occasionally   • Drug use: No   • Sexual activity: Yes     Partners: Female       Family History   Problem Relation Age of Onset   • Cancer Mother    • Diabetes Mother    • Cancer Father    • Other Maternal Grandmother    • Stroke Maternal Grandfather    • Cancer Paternal Grandmother        Objective    Ht 185.4 cm (73\")   Wt 95.3 kg (210 lb)   BMI 27.71 kg/m²     Physical Exam  Genitourinary:     Penis: Normal.       Testes: Normal.      Comments: Vas palpable bilaterally            Results for orders placed or performed in visit on 03/02/22   Hepatitis C Antibody    Specimen: Blood   Result Value Ref Range    Hepatitis C Ab Non-Reactive Non-Reactive   CBC (No " Diff)    Specimen: Blood   Result Value Ref Range    WBC 6.49 3.40 - 10.80 10*3/mm3    RBC 5.61 4.14 - 5.80 10*6/mm3    Hemoglobin 16.7 13.0 - 17.7 g/dL    Hematocrit 51.0 37.5 - 51.0 %    MCV 90.9 79.0 - 97.0 fL    MCH 29.8 26.6 - 33.0 pg    MCHC 32.7 31.5 - 35.7 g/dL    RDW 12.9 12.3 - 15.4 %    RDW-SD 42.5 37.0 - 54.0 fl    MPV 9.4 6.0 - 12.0 fL    Platelets 211 140 - 450 10*3/mm3   Hemoglobin A1c    Specimen: Blood   Result Value Ref Range    Hemoglobin A1C 5.20 4.80 - 5.60 %   Comprehensive Metabolic Panel    Specimen: Blood   Result Value Ref Range    Glucose 86 65 - 99 mg/dL    BUN 18 6 - 20 mg/dL    Creatinine 1.27 0.76 - 1.27 mg/dL    Sodium 137 136 - 145 mmol/L    Potassium 4.8 3.5 - 5.2 mmol/L    Chloride 100 98 - 107 mmol/L    CO2 25.1 22.0 - 29.0 mmol/L    Calcium 9.5 8.6 - 10.5 mg/dL    Total Protein 6.9 6.0 - 8.5 g/dL    Albumin 4.60 3.50 - 5.20 g/dL    ALT (SGPT) 30 1 - 41 U/L    AST (SGOT) 23 1 - 40 U/L    Alkaline Phosphatase 48 39 - 117 U/L    Total Bilirubin 0.4 0.0 - 1.2 mg/dL    Globulin 2.3 gm/dL    A/G Ratio 2.0 g/dL    BUN/Creatinine Ratio 14.2 7.0 - 25.0    Anion Gap 11.9 5.0 - 15.0 mmol/L    eGFR 70.1 >60.0 mL/min/1.73   Lipid Panel    Specimen: Blood   Result Value Ref Range    Total Cholesterol 227 (H) 0 - 200 mg/dL    Triglycerides 86 0 - 150 mg/dL    HDL Cholesterol 58 40 - 60 mg/dL    LDL Cholesterol  154 (H) 0 - 100 mg/dL    VLDL Cholesterol 15 5 - 40 mg/dL    LDL/HDL Ratio 2.62      Assessment and Plan    Diagnoses and all orders for this visit:    1. Encounter for vasectomy (Primary)            He was given the consent form, pre-vasectomy instruction sheet, and vasectomy booklet. I extensively reviewed with him the likely postoperative recuperative period as well as the need to continue to use contraception until he is notified by us of his sterility. He will have a semen analysis after 20-30 ejaculations. He understands the potential side effects of local anesthesia, bleeding,  scrotal hematoma, wound infection, epididymal orchitis, epididymal congestion,  1% risk chronic testicular pain potentially requiring further surgery, sperm granuloma, antisperm antibodies, early recanalization, spontaneous recanalization with pregnancy after demonstration of azoospermia risk of 1 in 2000 and the possible association with prostate cancer. He is aware of alternatives to vasectomy. He has given this careful consideration and wishes to proceed with a vasectomy.

## 2022-08-29 ENCOUNTER — OFFICE VISIT (OUTPATIENT)
Dept: UROLOGY | Facility: CLINIC | Age: 47
End: 2022-08-29

## 2022-08-29 VITALS — WEIGHT: 210 LBS | HEIGHT: 73 IN | BODY MASS INDEX: 27.83 KG/M2

## 2022-08-29 DIAGNOSIS — Z30.2 ENCOUNTER FOR VASECTOMY: Primary | ICD-10-CM

## 2022-08-29 PROCEDURE — 99203 OFFICE O/P NEW LOW 30 MIN: CPT | Performed by: UROLOGY

## 2022-08-29 RX ORDER — ALPRAZOLAM 2 MG/1
2 TABLET ORAL NIGHTLY PRN
Qty: 1 TABLET | Refills: 0 | Status: SHIPPED | OUTPATIENT
Start: 2022-08-29 | End: 2022-09-06

## 2022-08-29 RX ORDER — HYDROCODONE BITARTRATE AND ACETAMINOPHEN 7.5; 325 MG/1; MG/1
1 TABLET ORAL EVERY 6 HOURS PRN
Qty: 12 TABLET | Refills: 0 | Status: SHIPPED | OUTPATIENT
Start: 2022-08-29 | End: 2022-09-06

## 2022-09-06 ENCOUNTER — HOSPITAL ENCOUNTER (OUTPATIENT)
Dept: GENERAL RADIOLOGY | Facility: HOSPITAL | Age: 47
Discharge: HOME | End: 2022-09-06
Admitting: INTERNAL MEDICINE

## 2022-09-06 ENCOUNTER — OFFICE VISIT (OUTPATIENT)
Dept: INTERNAL MEDICINE | Facility: CLINIC | Age: 47
End: 2022-09-06

## 2022-09-06 VITALS
WEIGHT: 223 LBS | HEART RATE: 72 BPM | TEMPERATURE: 97.5 F | HEIGHT: 73 IN | OXYGEN SATURATION: 98 % | SYSTOLIC BLOOD PRESSURE: 140 MMHG | BODY MASS INDEX: 29.55 KG/M2 | DIASTOLIC BLOOD PRESSURE: 80 MMHG | RESPIRATION RATE: 15 BRPM

## 2022-09-06 DIAGNOSIS — R07.89 OTHER CHEST PAIN: ICD-10-CM

## 2022-09-06 DIAGNOSIS — M94.0 COSTOCHONDRITIS: ICD-10-CM

## 2022-09-06 DIAGNOSIS — R07.89 OTHER CHEST PAIN: Primary | ICD-10-CM

## 2022-09-06 PROCEDURE — 93000 ELECTROCARDIOGRAM COMPLETE: CPT | Performed by: INTERNAL MEDICINE

## 2022-09-06 PROCEDURE — 99213 OFFICE O/P EST LOW 20 MIN: CPT | Performed by: INTERNAL MEDICINE

## 2022-09-06 PROCEDURE — 71046 X-RAY EXAM CHEST 2 VIEWS: CPT

## 2022-09-06 RX ORDER — IBUPROFEN 800 MG/1
800 TABLET ORAL EVERY 8 HOURS PRN
Qty: 42 TABLET | Refills: 0 | Status: SHIPPED | OUTPATIENT
Start: 2022-09-06 | End: 2022-09-20

## 2022-09-06 NOTE — PROGRESS NOTES
Chief Complaint   Patient presents with   • Chest Pain   • Office Visit     Answers for HPI/ROS submitted by the patient on 9/6/2022  What is the primary reason for your visit?: Other  Please describe your symptoms.: Pain in chest for 3 days  Have you had these symptoms before?: No  How long have you been having these symptoms?: 1-4 days        History:  Tejas Fernandes is a 46 y.o. male who presents today for evaluation of the above problems.      He presents today for an acute visit for chest pain.    Symptoms started Saturday morning and have persisted since then.  He is having chest pain along with the top of his sternum which worsens with movement of his arms in certain directions.  Also hurts a little bit more when takes a deep breath.  Ibuprofen helps, and laying on his back also helps the pain.  He is taken 400 to 600 mg of ibuprofen 1-2 times per day.  He tried Prilosec which did not help the symptoms.    He denies any palpitations or shortness of breath.    Denies any leg swelling, personal or family history of blood clots.    He exercises frequently and the last time he exercised his chest was on Monday or Tuesday.  He has not worked out since Thursday.    HPI      ROS:  Review of Systems     See above      Current Outpatient Medications:   •  TESTOSTERONE IM, Inject 0.5 mL into the appropriate muscle as directed by prescriber 2 (Two) Times a Week., Disp: , Rfl:   •  ibuprofen (ADVIL,MOTRIN) 800 MG tablet, Take 1 tablet by mouth Every 8 (Eight) Hours As Needed for Moderate Pain for up to 14 days., Disp: 42 tablet, Rfl: 0    Lab Results   Component Value Date    GLUCOSE 86 03/02/2022    BUN 18 03/02/2022    CREATININE 1.27 03/02/2022    EGFRIFNONA 75 05/20/2019    BCR 14.2 03/02/2022    K 4.8 03/02/2022    CO2 25.1 03/02/2022    CALCIUM 9.5 03/02/2022    ALBUMIN 4.60 03/02/2022    AST 23 03/02/2022    ALT 30 03/02/2022       WBC   Date Value Ref Range Status   03/02/2022 6.49 3.40 - 10.80 10*3/mm3 Final      RBC   Date Value Ref Range Status   03/02/2022 5.61 4.14 - 5.80 10*6/mm3 Final     Hemoglobin   Date Value Ref Range Status   03/02/2022 16.7 13.0 - 17.7 g/dL Final     Hematocrit   Date Value Ref Range Status   03/02/2022 51.0 37.5 - 51.0 % Final     MCV   Date Value Ref Range Status   03/02/2022 90.9 79.0 - 97.0 fL Final     MCH   Date Value Ref Range Status   03/02/2022 29.8 26.6 - 33.0 pg Final     MCHC   Date Value Ref Range Status   03/02/2022 32.7 31.5 - 35.7 g/dL Final     RDW   Date Value Ref Range Status   03/02/2022 12.9 12.3 - 15.4 % Final     RDW-SD   Date Value Ref Range Status   03/02/2022 42.5 37.0 - 54.0 fl Final     MPV   Date Value Ref Range Status   03/02/2022 9.4 6.0 - 12.0 fL Final     Platelets   Date Value Ref Range Status   03/02/2022 211 140 - 450 10*3/mm3 Final     Neutrophil %   Date Value Ref Range Status   05/20/2019 48.1 39.0 - 78.0 % Final     Lymphocyte %   Date Value Ref Range Status   05/20/2019 38.1 15.0 - 45.0 % Final     Monocyte %   Date Value Ref Range Status   05/20/2019 9.8 4.0 - 12.0 % Final     Eosinophil %   Date Value Ref Range Status   05/20/2019 2.6 0.0 - 4.0 % Final     Basophil %   Date Value Ref Range Status   05/20/2019 0.9 0.0 - 2.0 % Final     Immature Grans %   Date Value Ref Range Status   05/20/2019 0.5 0.0 - 5.0 % Final     Neutrophils, Absolute   Date Value Ref Range Status   05/20/2019 2.73 1.87 - 8.40 10*3/mm3 Final     Lymphocytes, Absolute   Date Value Ref Range Status   05/20/2019 2.17 0.72 - 4.86 10*3/mm3 Final     Monocytes, Absolute   Date Value Ref Range Status   05/20/2019 0.56 0.19 - 1.30 10*3/mm3 Final     Eosinophils, Absolute   Date Value Ref Range Status   05/20/2019 0.15 0.00 - 0.70 10*3/mm3 Final     Basophils, Absolute   Date Value Ref Range Status   05/20/2019 0.05 0.00 - 0.20 10*3/mm3 Final     Immature Grans, Absolute   Date Value Ref Range Status   05/20/2019 0.03 0.00 - 0.05 10*3/mm3 Final     nRBC   Date Value Ref Range Status  "  05/20/2019 0.0 0.0 - 0.2 /100 WBC Final         OBJECTIVE:  Visit Vitals  /80 (BP Location: Left arm, Patient Position: Sitting, Cuff Size: Adult)   Pulse 72   Temp 97.5 °F (36.4 °C) (Oral)   Resp 15   Ht 185.4 cm (72.99\")   Wt 101 kg (223 lb)   SpO2 98%   BMI 29.43 kg/m²        Physical Exam  Vitals and nursing note reviewed.   Constitutional:       General: He is not in acute distress.     Appearance: Normal appearance. He is well-developed. He is not ill-appearing or toxic-appearing.      Comments: Pleasant    HENT:      Head: Normocephalic and atraumatic.   Eyes:      Pupils: Pupils are equal, round, and reactive to light.   Neck:      Thyroid: No thyromegaly.      Trachea: Phonation normal.   Cardiovascular:      Rate and Rhythm: Normal rate and regular rhythm.      Heart sounds: No murmur heard.  Pulmonary:      Effort: Pulmonary effort is normal. No respiratory distress.      Breath sounds: Normal breath sounds. No wheezing, rhonchi or rales.   Chest:      Chest wall: Tenderness (Reducible chest tenderness on palpation of the superior sternum) present.       Musculoskeletal:      Right lower leg: No edema.      Left lower leg: No edema.   Skin:     Coloration: Skin is not pale.      Findings: No erythema.   Neurological:      Mental Status: He is alert.   Psychiatric:         Behavior: Behavior normal.         Thought Content: Thought content normal.         Judgment: Judgment normal.         ECG 12 Lead    Date/Time: 9/6/2022 12:40 PM  Performed by: MARIELLA Alvarado MD  Authorized by: MARIELLA Alvarado MD   Comparison: not compared with previous ECG   Previous ECG: no previous ECG available  Rhythm: sinus rhythm  Rate: normal  BPM: 68  Conduction: conduction normal  ST Segments: ST segments normal  T Waves: T waves normal  QRS axis: left  Other: no other findings    Clinical impression: non-specific ECG  Comments: Suspect negative QRS axis in lead III to be from lead " positioning        Assessment/Plan    Diagnoses and all orders for this visit:    1. Other chest pain (Primary)  -     ECG 12 Lead  -     XR Chest PA & Lateral; Future  -     ibuprofen (ADVIL,MOTRIN) 800 MG tablet; Take 1 tablet by mouth Every 8 (Eight) Hours As Needed for Moderate Pain for up to 14 days.  Dispense: 42 tablet; Refill: 0    2. Costochondritis  -     ECG 12 Lead  -     XR Chest PA & Lateral; Future  -     ibuprofen (ADVIL,MOTRIN) 800 MG tablet; Take 1 tablet by mouth Every 8 (Eight) Hours As Needed for Moderate Pain for up to 14 days.  Dispense: 42 tablet; Refill: 0      Based on his symptoms and physical exam findings I suspect he has costochondritis.  Would recommend 800 mg ibuprofen 2-3 times per day.  If symptoms persist or worsen he may prescribe some steroids.  Do not think they are necessary at this time.  EKG essentially unremarkable.  See above for details.  We will get a chest x-ray for evaluation to make sure he does not osseous abnormality.    Keep next follow-up or sooner if indicated.    Return for Next scheduled follow up.      ISSAC Alvarado MD  11:41 CDT  9/6/2022

## 2022-10-11 ENCOUNTER — TELEPHONE (OUTPATIENT)
Dept: UROLOGY | Facility: CLINIC | Age: 47
End: 2022-10-11

## 2022-10-11 NOTE — TELEPHONE ENCOUNTER
Pt called to reschedule vasectomy on 10/14/22. Informed pt that we are booked until January so pt cancelled.

## 2022-12-19 ENCOUNTER — OFFICE VISIT (OUTPATIENT)
Dept: NEUROLOGY | Facility: CLINIC | Age: 47
End: 2022-12-19

## 2022-12-19 VITALS
SYSTOLIC BLOOD PRESSURE: 149 MMHG | WEIGHT: 225 LBS | HEIGHT: 73 IN | BODY MASS INDEX: 29.82 KG/M2 | DIASTOLIC BLOOD PRESSURE: 103 MMHG | HEART RATE: 67 BPM | OXYGEN SATURATION: 99 %

## 2022-12-19 DIAGNOSIS — G47.33 OBSTRUCTIVE SLEEP APNEA: Primary | ICD-10-CM

## 2022-12-19 PROCEDURE — 99213 OFFICE O/P EST LOW 20 MIN: CPT | Performed by: NURSE PRACTITIONER

## 2022-12-19 RX ORDER — METFORMIN HYDROCHLORIDE 500 MG/1
1000 TABLET, EXTENDED RELEASE ORAL DAILY
COMMUNITY
Start: 2022-10-01

## 2022-12-19 NOTE — PROGRESS NOTES
Neurology Progress Note    Chief Complaint:    Obstructive Sleep Apnea    Subjective   History of Present Illness:  Tejas Fernandes is a 47 y.o. male who presents today for obstructive sleep apnea.  He is routinely followed by MARIELLA Alvarado MD for primary care.     Obstructive Sleep Apnea  He reports that he continues to do well with his treatment.  He denies any symptoms of nonrestorative sleep, daytime hypersomnolence, difficulties initiating sleep, difficulties maintaining sleep, snoring, or restless sleep.  He feels that his treatment is doing very well.  He states that there continues to be some nights that he falls asleep before putting his CPAP on but for the most part he uses his CPAP appropriately.  He has no new questions or complaints and is able to receive all of his supplies appropriately.    Allergies:    Patient has no known allergies.    Medications:  Current Outpatient Medications   Medication Sig Dispense Refill   • metFORMIN ER (GLUCOPHAGE-XR) 500 MG 24 hr tablet Take 1,000 mg by mouth Daily.     • TESTOSTERONE IM Inject 0.5 mL into the appropriate muscle as directed by prescriber 2 (Two) Times a Week.       No current facility-administered medications for this visit.     Current outpatient and discharge medications have been reconciled for the patient.  Reviewed by: TIFFANIE Ramesh    Past Medical History:  Past Medical History:   Diagnosis Date   • GERD (gastroesophageal reflux disease)      Past Surgical History:   Procedure Laterality Date   • CORNEAL TRANSPLANT  1997   • EYE SURGERY  1997   • TONSILLECTOMY  1980     Family History   Problem Relation Age of Onset   • Cancer Mother    • Diabetes Mother    • Cancer Father    • Other Maternal Grandmother    • Stroke Maternal Grandfather    • Cancer Paternal Grandmother      Social History     Tobacco Use   • Smoking status: Never   • Smokeless tobacco: Never   Substance Use Topics   • Alcohol use: Yes     Alcohol/week: 2.0 standard  "drinks     Types: 2 Drinks containing 0.5 oz of alcohol per week     Comment: occasionally   • Drug use: No     Review of Systems   Psychiatric/Behavioral: Positive for sleep disturbance.         Objective   Vital Signs:  Heart Rate:  [67] 67  BP: (149)/(103) 149/103      12/19/22  0836   Weight: 102 kg (225 lb)     185.4 cm (72.99\")  Body mass index is 29.69 kg/m².    Physical Exam  Vitals reviewed.   Constitutional:       Appearance: Normal appearance.   HENT:      Head: Normocephalic.      Mouth/Throat:      Pharynx: Oropharynx is clear.   Eyes:      General: Lids are normal.      Extraocular Movements: Extraocular movements intact.      Pupils: Pupils are equal, round, and reactive to light.   Cardiovascular:      Rate and Rhythm: Normal rate and regular rhythm.      Pulses: Normal pulses.   Pulmonary:      Effort: Pulmonary effort is normal.   Musculoskeletal:         General: Normal range of motion.      Cervical back: Normal range of motion and neck supple.   Skin:     General: Skin is warm and dry.      Capillary Refill: Capillary refill takes less than 2 seconds.   Neurological:      Motor: Motor strength is normal.      Coordination: Coordination is intact.      Deep Tendon Reflexes: Reflexes are normal and symmetric.   Psychiatric:         Mood and Affect: Mood normal.         Speech: Speech normal.       Neurological Exam  Mental Status  Awake, alert and oriented to person, place and time. Recent and remote memory are intact. Speech is normal. Language is fluent with no aphasia. Attention and concentration are normal.    Cranial Nerves  CN II: Visual acuity is normal. Visual fields full to confrontation.  CN III, IV, VI: Extraocular movements intact bilaterally. Normal lids and orbits bilaterally. Pupils equal round and reactive to light bilaterally.  CN V: Facial sensation is normal.  CN VII: Full and symmetric facial movement.  CN IX, X: Palate elevates symmetrically. Normal gag reflex.  CN XI: " Shoulder shrug strength is normal.  CN XII: Tongue midline without atrophy or fasciculations.    Motor   Strength is 5/5 throughout all four extremities.    Sensory  Sensation is intact to light touch, pinprick, vibration and proprioception in all four extremities.    Reflexes  Deep tendon reflexes are 2+ and symmetric in all four extremities.    Coordination    Finger-to-nose, rapid alternating movements and heel-to-shin normal bilaterally without dysmetria.    Gait  Normal casual, toe, heel and tandem gait.    Neck Circumference: 17.5 inches  Mallampati Score: 2    Callao Sleepiness Scale: 6  STOP-BANG: Intermediate    Compliance Report:  This report is for the dates of 11/19/2022-12/18/2022.  Patient is advised to 25/30 days for an 83% compliance.  Of those dates patient use device for greater than 4 hours for 21 days for 70% compliance.  Patient is currently set on APAP with pressures 8-16 cm H2O.  Current AHI 0.4.     Results Review:    Lab Results   Component Value Date    GLUCOSE 86 03/02/2022    BUN 18 03/02/2022    CREATININE 1.27 03/02/2022    EGFRIFNONA 75 05/20/2019    BCR 14.2 03/02/2022    K 4.8 03/02/2022    CO2 25.1 03/02/2022    CALCIUM 9.5 03/02/2022    ALBUMIN 4.60 03/02/2022    AST 23 03/02/2022    ALT 30 03/02/2022     Lab Results   Component Value Date    WBC 6.49 03/02/2022    HGB 16.7 03/02/2022    HCT 51.0 03/02/2022    MCV 90.9 03/02/2022     03/02/2022     Lab Results   Component Value Date    CHOL 227 (H) 03/02/2022    TRIG 86 03/02/2022    HDL 58 03/02/2022     (H) 03/02/2022     Lab Results   Component Value Date    TSH 2.680 05/20/2019     Lab Results   Component Value Date    HGBA1C 5.20 03/02/2022     No results found for: FOLATE  No results found for: [id number]       Plan .  Assessment:  Tejas Fernandes is a 47 y.o. male who presents with obstructive sleep apnea.  He has borderline poor compliance at this time.  I have encouraged him to increase his compliance at this  time.  However, he reports successful treatment.  His AHI is appropriately maintained.  He should continue annual follow-ups for his obstructive sleep apnea and will transition his care to pulmonology office with Dr. Prashant MD.  I explained this transition to the patient and he states understanding.    Plan:  • Continue CPAP.  Encouraged Compliance  • Recommend a regular sleep schedule and sleep hygiene  • Discussed risks of untreated sleep apnea  • Recommend regular physical activity and a balanced diet  • Call me with any questions or concerns.    The patient and I have discussed the plan of care and he is in full agreement at this time.     Follow-Up:  Return in about 1 year (around 12/19/2023) for Obstructive sleep apnea.       TIFFANIE Ramesh  12/19/22  09:26 CST

## 2023-04-25 ENCOUNTER — LAB (OUTPATIENT)
Dept: LAB | Facility: HOSPITAL | Age: 48
End: 2023-04-25
Payer: COMMERCIAL

## 2023-04-25 ENCOUNTER — OFFICE VISIT (OUTPATIENT)
Dept: INTERNAL MEDICINE | Facility: CLINIC | Age: 48
End: 2023-04-25
Payer: COMMERCIAL

## 2023-04-25 VITALS
HEART RATE: 70 BPM | WEIGHT: 237.5 LBS | BODY MASS INDEX: 31.48 KG/M2 | TEMPERATURE: 97.8 F | DIASTOLIC BLOOD PRESSURE: 86 MMHG | SYSTOLIC BLOOD PRESSURE: 130 MMHG | HEIGHT: 73 IN | OXYGEN SATURATION: 99 %

## 2023-04-25 DIAGNOSIS — Z00.00 ENCOUNTER FOR PREVENTIVE CARE: ICD-10-CM

## 2023-04-25 DIAGNOSIS — Z12.11 SCREEN FOR COLON CANCER: ICD-10-CM

## 2023-04-25 DIAGNOSIS — E66.9 CLASS 1 OBESITY WITHOUT SERIOUS COMORBIDITY WITH BODY MASS INDEX (BMI) OF 31.0 TO 31.9 IN ADULT, UNSPECIFIED OBESITY TYPE: ICD-10-CM

## 2023-04-25 DIAGNOSIS — Z00.00 ENCOUNTER FOR PREVENTIVE CARE: Primary | ICD-10-CM

## 2023-04-25 DIAGNOSIS — Z85.820 HISTORY OF MELANOMA: ICD-10-CM

## 2023-04-25 DIAGNOSIS — Z13.6 HYPERTENSION SCREEN: ICD-10-CM

## 2023-04-25 LAB
ALBUMIN SERPL-MCNC: 4.5 G/DL (ref 3.5–5.2)
ALBUMIN/GLOB SERPL: 1.7 G/DL
ALP SERPL-CCNC: 47 U/L (ref 39–117)
ALT SERPL W P-5'-P-CCNC: 31 U/L (ref 1–41)
ANION GAP SERPL CALCULATED.3IONS-SCNC: 10 MMOL/L (ref 5–15)
AST SERPL-CCNC: 25 U/L (ref 1–40)
BILIRUB SERPL-MCNC: 0.5 MG/DL (ref 0–1.2)
BUN SERPL-MCNC: 10 MG/DL (ref 6–20)
BUN/CREAT SERPL: 8.3 (ref 7–25)
CALCIUM SPEC-SCNC: 9 MG/DL (ref 8.6–10.5)
CHLORIDE SERPL-SCNC: 103 MMOL/L (ref 98–107)
CHOLEST SERPL-MCNC: 198 MG/DL (ref 0–200)
CO2 SERPL-SCNC: 26 MMOL/L (ref 22–29)
CREAT SERPL-MCNC: 1.21 MG/DL (ref 0.76–1.27)
DEPRECATED RDW RBC AUTO: 41.2 FL (ref 37–54)
EGFRCR SERPLBLD CKD-EPI 2021: 73.9 ML/MIN/1.73
ERYTHROCYTE [DISTWIDTH] IN BLOOD BY AUTOMATED COUNT: 12.8 % (ref 12.3–15.4)
GLOBULIN UR ELPH-MCNC: 2.6 GM/DL
GLUCOSE SERPL-MCNC: 98 MG/DL (ref 65–99)
HBA1C MFR BLD: 5.3 % (ref 4.8–5.6)
HCT VFR BLD AUTO: 47.5 % (ref 37.5–51)
HDLC SERPL-MCNC: 56 MG/DL (ref 40–60)
HGB BLD-MCNC: 16.4 G/DL (ref 13–17.7)
LDLC SERPL CALC-MCNC: 130 MG/DL (ref 0–100)
LDLC/HDLC SERPL: 2.3 {RATIO}
MCH RBC QN AUTO: 30.7 PG (ref 26.6–33)
MCHC RBC AUTO-ENTMCNC: 34.5 G/DL (ref 31.5–35.7)
MCV RBC AUTO: 89 FL (ref 79–97)
PLATELET # BLD AUTO: 186 10*3/MM3 (ref 140–450)
PMV BLD AUTO: 9.6 FL (ref 6–12)
POTASSIUM SERPL-SCNC: 4.4 MMOL/L (ref 3.5–5.2)
PROT SERPL-MCNC: 7.1 G/DL (ref 6–8.5)
RBC # BLD AUTO: 5.34 10*6/MM3 (ref 4.14–5.8)
SODIUM SERPL-SCNC: 139 MMOL/L (ref 136–145)
TRIGL SERPL-MCNC: 65 MG/DL (ref 0–150)
VLDLC SERPL-MCNC: 12 MG/DL (ref 5–40)
WBC NRBC COR # BLD: 6.69 10*3/MM3 (ref 3.4–10.8)

## 2023-04-25 PROCEDURE — 99396 PREV VISIT EST AGE 40-64: CPT | Performed by: INTERNAL MEDICINE

## 2023-04-25 PROCEDURE — 83036 HEMOGLOBIN GLYCOSYLATED A1C: CPT

## 2023-04-25 PROCEDURE — 80053 COMPREHEN METABOLIC PANEL: CPT

## 2023-04-25 PROCEDURE — 80061 LIPID PANEL: CPT

## 2023-04-25 PROCEDURE — 36415 COLL VENOUS BLD VENIPUNCTURE: CPT

## 2023-04-25 PROCEDURE — 85027 COMPLETE CBC AUTOMATED: CPT

## 2023-04-25 NOTE — PROGRESS NOTES
Chief Complaint   Patient presents with   • Annual Exam       History:  Tejas Fernandes is a 47 y.o. male who presents today for evaluation of the above problems.      Zaid presents today for his annual physical.  He denies any new issues or complaints.    He just finished with tax season and his exercise has decreased as such.  He is starting to work on exercise again.    Denies any tobacco or recreational drug use.  He occasionally drinks alcohol    Has not had colorectal cancer screening.    No new concerning skin lesions.  He has had history of melanoma in his left arm followed by Dr. Vargas every 6 months.    He sees Dr. Black for testosterone therapy.  His last dose was yesterday.  Has been a while since his last blood work.    MSK Chest pain has resolved without any recurrence    HPI    Social History     Socioeconomic History   • Marital status:    Tobacco Use   • Smoking status: Never   • Smokeless tobacco: Never   Substance and Sexual Activity   • Alcohol use: Yes     Alcohol/week: 2.0 standard drinks     Types: 2 Drinks containing 0.5 oz of alcohol per week     Comment: occasionally   • Drug use: No   • Sexual activity: Yes     Partners: Female     Birth control/protection: Condom, Birth control pill     ROS:  Review of Systems   Constitutional: Negative for activity change, appetite change, fatigue, fever and unexpected weight change.   HENT: Negative.    Eyes: Negative for pain and visual disturbance.   Respiratory: Negative for cough, chest tightness and shortness of breath.    Cardiovascular: Negative for chest pain, palpitations and leg swelling.   Gastrointestinal: Negative for abdominal pain, constipation, diarrhea, nausea and vomiting.   Endocrine: Negative for cold intolerance and heat intolerance.   Genitourinary: Negative.    Musculoskeletal: Negative.  Negative for back pain, neck pain and neck stiffness.   Skin: Negative for rash and wound.   Neurological: Negative for dizziness, syncope,  weakness, light-headedness and headaches.   Hematological: Negative for adenopathy. Does not bruise/bleed easily.   Psychiatric/Behavioral: Negative for agitation, behavioral problems and confusion.         Current Outpatient Medications:   •  metFORMIN ER (GLUCOPHAGE-XR) 500 MG 24 hr tablet, Take 2 tablets by mouth Daily., Disp: , Rfl:   •  TESTOSTERONE IM, Inject 0.5 mL into the appropriate muscle as directed by prescriber 2 (Two) Times a Week., Disp: , Rfl:     Lab Results   Component Value Date    GLUCOSE 86 03/02/2022    BUN 18 03/02/2022    CREATININE 1.27 03/02/2022    EGFR 70.1 03/02/2022    BCR 14.2 03/02/2022    K 4.8 03/02/2022    CO2 25.1 03/02/2022    CALCIUM 9.5 03/02/2022    ALBUMIN 4.60 03/02/2022    BILITOT 0.4 03/02/2022    AST 23 03/02/2022    ALT 30 03/02/2022       WBC   Date Value Ref Range Status   03/02/2022 6.49 3.40 - 10.80 10*3/mm3 Final     RBC   Date Value Ref Range Status   03/02/2022 5.61 4.14 - 5.80 10*6/mm3 Final     Hemoglobin   Date Value Ref Range Status   03/02/2022 16.7 13.0 - 17.7 g/dL Final     Hematocrit   Date Value Ref Range Status   03/02/2022 51.0 37.5 - 51.0 % Final     MCV   Date Value Ref Range Status   03/02/2022 90.9 79.0 - 97.0 fL Final     MCH   Date Value Ref Range Status   03/02/2022 29.8 26.6 - 33.0 pg Final     MCHC   Date Value Ref Range Status   03/02/2022 32.7 31.5 - 35.7 g/dL Final     RDW   Date Value Ref Range Status   03/02/2022 12.9 12.3 - 15.4 % Final     RDW-SD   Date Value Ref Range Status   03/02/2022 42.5 37.0 - 54.0 fl Final     MPV   Date Value Ref Range Status   03/02/2022 9.4 6.0 - 12.0 fL Final     Platelets   Date Value Ref Range Status   03/02/2022 211 140 - 450 10*3/mm3 Final     Neutrophil %   Date Value Ref Range Status   05/20/2019 48.1 39.0 - 78.0 % Final     Lymphocyte %   Date Value Ref Range Status   05/20/2019 38.1 15.0 - 45.0 % Final     Monocyte %   Date Value Ref Range Status   05/20/2019 9.8 4.0 - 12.0 % Final     Eosinophil %  "  Date Value Ref Range Status   05/20/2019 2.6 0.0 - 4.0 % Final     Basophil %   Date Value Ref Range Status   05/20/2019 0.9 0.0 - 2.0 % Final     Immature Grans %   Date Value Ref Range Status   05/20/2019 0.5 0.0 - 5.0 % Final     Neutrophils, Absolute   Date Value Ref Range Status   05/20/2019 2.73 1.87 - 8.40 10*3/mm3 Final     Lymphocytes, Absolute   Date Value Ref Range Status   05/20/2019 2.17 0.72 - 4.86 10*3/mm3 Final     Monocytes, Absolute   Date Value Ref Range Status   05/20/2019 0.56 0.19 - 1.30 10*3/mm3 Final     Eosinophils, Absolute   Date Value Ref Range Status   05/20/2019 0.15 0.00 - 0.70 10*3/mm3 Final     Basophils, Absolute   Date Value Ref Range Status   05/20/2019 0.05 0.00 - 0.20 10*3/mm3 Final     Immature Grans, Absolute   Date Value Ref Range Status   05/20/2019 0.03 0.00 - 0.05 10*3/mm3 Final     nRBC   Date Value Ref Range Status   05/20/2019 0.0 0.0 - 0.2 /100 WBC Final         OBJECTIVE:  Visit Vitals  /86 (BP Location: Left arm, Patient Position: Sitting, Cuff Size: Adult)   Pulse 70   Temp 97.8 °F (36.6 °C) (Temporal)   Ht 185.4 cm (72.99\")   Wt 108 kg (237 lb 8 oz)   SpO2 99%   BMI 31.34 kg/m²      Physical Exam  Vitals and nursing note reviewed.   Constitutional:       General: He is not in acute distress.     Appearance: Normal appearance. He is well-developed.      Comments: Pleasant   HENT:      Head: Normocephalic and atraumatic.      Right Ear: Tympanic membrane, ear canal and external ear normal. There is impacted cerumen.      Left Ear: Tympanic membrane, ear canal and external ear normal. There is impacted cerumen.      Mouth/Throat:      Pharynx: No oropharyngeal exudate.   Eyes:      General: No scleral icterus.     Conjunctiva/sclera: Conjunctivae normal.      Pupils: Pupils are equal, round, and reactive to light.   Neck:      Thyroid: No thyromegaly.      Vascular: No JVD.   Cardiovascular:      Rate and Rhythm: Normal rate and regular rhythm.      Heart " sounds: Normal heart sounds. No murmur heard.    No friction rub. No gallop.   Pulmonary:      Effort: Pulmonary effort is normal. No respiratory distress.      Breath sounds: Normal breath sounds. No stridor. No wheezing, rhonchi or rales.   Abdominal:      General: Bowel sounds are normal. There is no distension.      Palpations: Abdomen is soft.      Tenderness: There is no abdominal tenderness.   Musculoskeletal:      Right lower leg: No edema.      Left lower leg: No edema.   Skin:     General: Skin is warm and dry.      Coloration: Skin is not jaundiced.   Neurological:      Mental Status: He is alert.      Cranial Nerves: No cranial nerve deficit.   Psychiatric:         Mood and Affect: Mood normal.         Behavior: Behavior normal.         Thought Content: Thought content normal.         Judgment: Judgment normal.         Assessment/Plan    Diagnoses and all orders for this visit:    1. Encounter for preventive care (Primary)  -     CBC (No Diff); Future  -     Comprehensive Metabolic Panel; Future  -     Hemoglobin A1c; Future  -     Lipid Panel; Future    2. Hypertension screen    3. Screen for colon cancer  -     Cologuard - Stool, Per Rectum; Future    4. Class 1 obesity without serious comorbidity with body mass index (BMI) of 31.0 to 31.9 in adult, unspecified obesity type    5. History of melanoma (left upper arm, Dr. Vargas)      Would like to check some blood work today.  Depression screen was negative with PHQ 2 of 0.  Hypertension screen was negative with a blood pressure 130/86.    Ordered another Cologuard for colorectal cancer screening.    We discussed vaccines.  He may consider getting a Prevnar 20 at a later date.    Counseling/Anticipatory Guidance Discussed: nutrition, physical activity, healthy weight, misuse of tobacco, alcohol and drugs, immunizations and screenings    BMI is >= 30 and <35. (Class 1 Obesity). The following options were offered after discussion;: exercise  counseling/recommendations      Return in about 1 year (around 4/25/2024) for Annual physical.    Patient was given instructions and counseling regarding his/her condition or for health maintenance advice. Please see specific information pulled into the AVS if appropriate.     ISSAC Alvarado MD  08:17 CDT  4/25/2023

## 2024-04-26 ENCOUNTER — OFFICE VISIT (OUTPATIENT)
Dept: INTERNAL MEDICINE | Facility: CLINIC | Age: 49
End: 2024-04-26
Payer: COMMERCIAL

## 2024-04-26 VITALS
OXYGEN SATURATION: 97 % | HEIGHT: 74 IN | SYSTOLIC BLOOD PRESSURE: 118 MMHG | HEART RATE: 77 BPM | DIASTOLIC BLOOD PRESSURE: 82 MMHG | WEIGHT: 233.5 LBS | TEMPERATURE: 97.4 F | BODY MASS INDEX: 29.97 KG/M2

## 2024-04-26 DIAGNOSIS — Z12.11 ENCOUNTER FOR COLORECTAL CANCER SCREENING: ICD-10-CM

## 2024-04-26 DIAGNOSIS — Z13.31 DEPRESSION SCREEN: ICD-10-CM

## 2024-04-26 DIAGNOSIS — Z00.00 ENCOUNTER FOR PREVENTIVE CARE: Primary | ICD-10-CM

## 2024-04-26 DIAGNOSIS — E66.3 OVERWEIGHT (BMI 25.0-29.9): ICD-10-CM

## 2024-04-26 DIAGNOSIS — Z13.6 HYPERTENSION SCREEN: ICD-10-CM

## 2024-04-26 DIAGNOSIS — Z12.12 ENCOUNTER FOR COLORECTAL CANCER SCREENING: ICD-10-CM

## 2024-04-26 NOTE — PROGRESS NOTES
Chief Complaint   Patient presents with    Annual Exam         History:  Tejas Fernandes is a 48 y.o. male who presents today for evaluation of the above problems.      Zadi presents today for his annual physical.  He is doing well without any new issues or complaints.    There is no tobacco use, recreational drug use and he only occasionally drinks alcohol.    He is exercising and diet is healthy.  He is experiencing weight loss with lifestyle modifications.    He is up-to-date on both his eye and dental exams    Continues to follow-up once a year with Dr. Yani Vargas for his history of melanoma in the left arm.    No family history of prostate cancer.    HPI    Social History     Socioeconomic History    Marital status:    Tobacco Use    Smoking status: Never    Smokeless tobacco: Never   Vaping Use    Vaping status: Never Used   Substance and Sexual Activity    Alcohol use: Yes     Alcohol/week: 2.0 standard drinks of alcohol     Types: 2 Drinks containing 0.5 oz of alcohol per week     Comment: occasionally    Drug use: No    Sexual activity: Yes     Partners: Female     Birth control/protection: Condom, Birth control pill       PHQ-9 Depression Screening  Little interest or pleasure in doing things? 0-->not at all   Feeling down, depressed, or hopeless? 0-->not at all   Trouble falling or staying asleep, or sleeping too much?     Feeling tired or having little energy?     Poor appetite or overeating?     Feeling bad about yourself - or that you are a failure or have let yourself or your family down?     Trouble concentrating on things, such as reading the newspaper or watching television?     Moving or speaking so slowly that other people could have noticed? Or the opposite - being so fidgety or restless that you have been moving around a lot more than usual?     Thoughts that you would be better off dead, or of hurting yourself in some way?     PHQ-9 Total Score 0   If you checked off any problems, how  difficult have these problems made it for you to do your work, take care of things at home, or get along with other people?         PHQ-9 Total Score: 0    ROS:  Review of Systems   Constitutional:  Negative for activity change, appetite change, fatigue, fever and unexpected weight change.   HENT:  Negative for nosebleeds, sore throat and trouble swallowing.    Eyes:  Negative for pain and visual disturbance.   Respiratory:  Negative for cough, chest tightness and shortness of breath.    Cardiovascular:  Negative for chest pain, palpitations and leg swelling.   Gastrointestinal:  Negative for abdominal pain, constipation, diarrhea, nausea and vomiting.   Endocrine: Negative for cold intolerance and heat intolerance.   Genitourinary: Negative.    Musculoskeletal: Negative.  Negative for back pain, neck pain and neck stiffness.   Skin:  Negative for rash and wound.   Neurological:  Negative for dizziness, syncope, weakness, light-headedness and headaches.   Hematological:  Negative for adenopathy. Does not bruise/bleed easily.   Psychiatric/Behavioral:  Negative for agitation, behavioral problems and confusion.          Current Outpatient Medications:     TESTOSTERONE IM, Inject 0.5 mL into the appropriate muscle as directed by prescriber 2 (Two) Times a Week., Disp: , Rfl:     Lab Results   Component Value Date    GLUCOSE 98 04/25/2023    BUN 10 04/25/2023    CREATININE 1.21 04/25/2023    EGFR 73.9 04/25/2023    BCR 8.3 04/25/2023    K 4.4 04/25/2023    CO2 26.0 04/25/2023    CALCIUM 9.0 04/25/2023    ALBUMIN 4.5 04/25/2023    BILITOT 0.5 04/25/2023    AST 25 04/25/2023    ALT 31 04/25/2023       WBC   Date Value Ref Range Status   04/25/2023 6.69 3.40 - 10.80 10*3/mm3 Final     RBC   Date Value Ref Range Status   04/25/2023 5.34 4.14 - 5.80 10*6/mm3 Final     Hemoglobin   Date Value Ref Range Status   04/25/2023 16.4 13.0 - 17.7 g/dL Final     Hematocrit   Date Value Ref Range Status   04/25/2023 47.5 37.5 - 51.0 %  "Final     MCV   Date Value Ref Range Status   04/25/2023 89.0 79.0 - 97.0 fL Final     MCH   Date Value Ref Range Status   04/25/2023 30.7 26.6 - 33.0 pg Final     MCHC   Date Value Ref Range Status   04/25/2023 34.5 31.5 - 35.7 g/dL Final     RDW   Date Value Ref Range Status   04/25/2023 12.8 12.3 - 15.4 % Final     RDW-SD   Date Value Ref Range Status   04/25/2023 41.2 37.0 - 54.0 fl Final     MPV   Date Value Ref Range Status   04/25/2023 9.6 6.0 - 12.0 fL Final     Platelets   Date Value Ref Range Status   04/25/2023 186 140 - 450 10*3/mm3 Final     Neutrophil %   Date Value Ref Range Status   05/20/2019 48.1 39.0 - 78.0 % Final     Lymphocyte %   Date Value Ref Range Status   05/20/2019 38.1 15.0 - 45.0 % Final     Monocyte %   Date Value Ref Range Status   05/20/2019 9.8 4.0 - 12.0 % Final     Eosinophil %   Date Value Ref Range Status   05/20/2019 2.6 0.0 - 4.0 % Final     Basophil %   Date Value Ref Range Status   05/20/2019 0.9 0.0 - 2.0 % Final     Immature Grans %   Date Value Ref Range Status   05/20/2019 0.5 0.0 - 5.0 % Final     Neutrophils, Absolute   Date Value Ref Range Status   05/20/2019 2.73 1.87 - 8.40 10*3/mm3 Final     Lymphocytes, Absolute   Date Value Ref Range Status   05/20/2019 2.17 0.72 - 4.86 10*3/mm3 Final     Monocytes, Absolute   Date Value Ref Range Status   05/20/2019 0.56 0.19 - 1.30 10*3/mm3 Final     Eosinophils, Absolute   Date Value Ref Range Status   05/20/2019 0.15 0.00 - 0.70 10*3/mm3 Final     Basophils, Absolute   Date Value Ref Range Status   05/20/2019 0.05 0.00 - 0.20 10*3/mm3 Final     Immature Grans, Absolute   Date Value Ref Range Status   05/20/2019 0.03 0.00 - 0.05 10*3/mm3 Final     nRBC   Date Value Ref Range Status   05/20/2019 0.0 0.0 - 0.2 /100 WBC Final         OBJECTIVE:  Visit Vitals  /82 (BP Location: Left arm, Patient Position: Sitting, Cuff Size: Adult)   Pulse 77   Temp 97.4 °F (36.3 °C) (Temporal)   Ht 188 cm (74\")   Wt 106 kg (233 lb 8 oz) "   SpO2 97%   BMI 29.98 kg/m²      Physical Exam  Vitals and nursing note reviewed.   Constitutional:       General: He is not in acute distress.     Appearance: Normal appearance. He is well-developed. He is not ill-appearing.      Comments: Pleasant   HENT:      Head: Normocephalic and atraumatic.      Right Ear: There is impacted cerumen.      Left Ear: There is impacted cerumen.      Mouth/Throat:      Pharynx: No oropharyngeal exudate.   Eyes:      General: No scleral icterus.     Conjunctiva/sclera: Conjunctivae normal.      Pupils: Pupils are equal, round, and reactive to light.   Neck:      Thyroid: No thyromegaly.      Vascular: No JVD.   Cardiovascular:      Rate and Rhythm: Normal rate and regular rhythm.      Heart sounds: Normal heart sounds. No murmur heard.     No friction rub. No gallop.   Pulmonary:      Effort: Pulmonary effort is normal. No respiratory distress.      Breath sounds: Normal breath sounds. No stridor. No wheezing, rhonchi or rales.   Abdominal:      General: Bowel sounds are normal. There is no distension.      Palpations: Abdomen is soft.      Tenderness: There is no abdominal tenderness.   Musculoskeletal:      Right lower leg: No edema.      Left lower leg: No edema.   Skin:     General: Skin is warm and dry.      Coloration: Skin is not jaundiced.   Neurological:      Mental Status: He is alert.      Cranial Nerves: No cranial nerve deficit.   Psychiatric:         Mood and Affect: Mood normal.         Behavior: Behavior normal.         Thought Content: Thought content normal.         Judgment: Judgment normal.         Assessment/Plan    Diagnoses and all orders for this visit:    1. Encounter for preventive care (Primary)  -     CBC (No Diff); Future  -     Comprehensive Metabolic Panel; Future  -     Hemoglobin A1c; Future  -     Lipid Panel; Future    2. Depression screen    3. Overweight (BMI 25.0-29.9)    4. Encounter for colorectal cancer screening  -     Cologuard - Stool,  Per Rectum; Future      Would like to check some blood work for preventative care.  He will come back sometime next week to get the labs drawn.  Depression screen negative with a PHQ 2 of 0.  Hypertension screen was negative with a blood pressure 118/82.    Zaid prefers to get a Cologuard for colorectal cancer screening rather than a colonoscopy at this time.    Counseling/Anticipatory Guidance Discussed: nutrition, physical activity, healthy weight, misuse of tobacco, alcohol and drugs, dental health, mental health, immunizations, and screenings    BMI is >= 25 and <30. (Overweight) The following options were offered after discussion;: weight loss educational material (shared in after visit summary) and exercise counseling/recommendations      Return in about 1 year (around 4/26/2025) for Annual physical.    Patient was given instructions and counseling regarding his/her condition or for health maintenance advice. Please see specific information pulled into the AVS if appropriate.     ISSAC Alvarado MD  08:14 CDT  4/26/2024  Electronically signed